# Patient Record
Sex: FEMALE | Race: WHITE | NOT HISPANIC OR LATINO
[De-identification: names, ages, dates, MRNs, and addresses within clinical notes are randomized per-mention and may not be internally consistent; named-entity substitution may affect disease eponyms.]

---

## 2020-11-05 PROBLEM — Z00.00 ENCOUNTER FOR PREVENTIVE HEALTH EXAMINATION: Status: ACTIVE | Noted: 2020-11-05

## 2020-11-10 ENCOUNTER — APPOINTMENT (OUTPATIENT)
Dept: ORTHOPEDIC SURGERY | Facility: CLINIC | Age: 65
End: 2020-11-10
Payer: MEDICARE

## 2020-11-10 ENCOUNTER — TRANSCRIPTION ENCOUNTER (OUTPATIENT)
Age: 65
End: 2020-11-10

## 2020-11-10 VITALS
BODY MASS INDEX: 26.68 KG/M2 | DIASTOLIC BLOOD PRESSURE: 85 MMHG | HEART RATE: 105 BPM | WEIGHT: 145 LBS | HEIGHT: 62 IN | SYSTOLIC BLOOD PRESSURE: 149 MMHG

## 2020-11-10 VITALS — TEMPERATURE: 97.6 F

## 2020-11-10 DIAGNOSIS — Z78.9 OTHER SPECIFIED HEALTH STATUS: ICD-10-CM

## 2020-11-10 PROCEDURE — 20550 NJX 1 TENDON SHEATH/LIGAMENT: CPT | Mod: F7

## 2020-11-10 PROCEDURE — 99203 OFFICE O/P NEW LOW 30 MIN: CPT | Mod: 25

## 2020-11-10 RX ORDER — FENOFIBRATE 134 MG/1
134 CAPSULE ORAL
Qty: 90 | Refills: 0 | Status: ACTIVE | COMMUNITY
Start: 2019-11-13

## 2020-11-10 RX ORDER — SIMVASTATIN 20 MG/1
20 TABLET, FILM COATED ORAL
Qty: 90 | Refills: 0 | Status: ACTIVE | COMMUNITY
Start: 2019-11-13

## 2023-03-22 ENCOUNTER — NON-APPOINTMENT (OUTPATIENT)
Age: 68
End: 2023-03-22

## 2023-03-28 ENCOUNTER — APPOINTMENT (OUTPATIENT)
Dept: ORTHOPEDIC SURGERY | Facility: CLINIC | Age: 68
End: 2023-03-28
Payer: MEDICARE

## 2023-03-28 VITALS — WEIGHT: 145 LBS | BODY MASS INDEX: 26.68 KG/M2 | HEIGHT: 62 IN

## 2023-03-28 DIAGNOSIS — M65.342 TRIGGER FINGER, LEFT RING FINGER: ICD-10-CM

## 2023-03-28 DIAGNOSIS — M65.332 TRIGGER FINGER, LEFT MIDDLE FINGER: ICD-10-CM

## 2023-03-28 DIAGNOSIS — M65.331 TRIGGER FINGER, RIGHT MIDDLE FINGER: ICD-10-CM

## 2023-03-28 PROCEDURE — 20550 NJX 1 TENDON SHEATH/LIGAMENT: CPT | Mod: 59,F3

## 2023-03-28 PROCEDURE — 99214 OFFICE O/P EST MOD 30 MIN: CPT | Mod: 25

## 2023-03-29 PROBLEM — M65.331 TRIGGER MIDDLE FINGER OF RIGHT HAND: Status: ACTIVE | Noted: 2020-11-10

## 2023-03-29 NOTE — PHYSICAL EXAM
[de-identified] : Right hand:\par Long finger A1 pulley slight tender.\par When patient makes composite fist long finger locks then triggers into full extension.\par minimal discomfort\par There is no other A1 pulley tenderness or triggering in any other finger, right hand.\par Basal joint manipulation minimal crepitus without pain\par No pertinent MP, PIP, or DIP joint contributory findings, except some Heberden's nodes; none are clinically painful.\par \par Left hand\par Minimal  A1 pulley tenderness long and ring finger\par No notable A1 pulley tenderness thumb, index, little fingers.\par When patient makes tight fist long and ring fingers lock,  then trigger into extension\par Pain associated\par No other A1 pulley tenderness and no other triggering in any finger.\par No pertinent MP, PIP, or DIP joint contributory findings, except some Heberden's nodes; none are clinically painful.  \par Basal joint no crepitus, no pain with manipulation\par \par Neurologic: Median, ulnar, and radial motor and sensory are intact. \par Skin: No cyanosis, clubbing, or rashes.\par Vascular: Radial pulses intact.\par Lymphatic: No streaking or epitrochlear adenopathy.\par The patient is awake, alert, and oriented. Affect appropriate. Cooperative.\par

## 2023-03-29 NOTE — DISCUSSION/SUMMARY
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  The patient has painful locking of the left long and ring trigger fingers at night for the past few months.  Because symptoms can be quite severe patient presents for treatment.  Cortisone injection was effective in the right long finger which only locks occasionally and is not painful.  Consequently, patient requested and was treated with cortisone injection in 2 phases for left long and left ring trigger finger which was well-tolerated.\par Patient has no other active hand problem requiring treatment.\par Because patient is playing tennis regularly, she remains at risk for recurrence and this was discussed.\par Prognosis uncertain because of ongoing tenderness playing and skiing.\par Return as needed \par The following post-injection instructions were given to the patient: The patient should be cautious in activities for two weeks and then increase to full activities.  Thereafter, the patient should return if symptoms continue, or if they kayleen and recur subsequently. If symptoms do not recur, the patient need not return and can be seen on an as needed basis. The patient has expressed understanding and acceptance of analysis, treatment, and recommendations.  All questions answered.

## 2023-03-29 NOTE — HISTORY OF PRESENT ILLNESS
[FreeTextEntry1] : Patient is 68 yo RHD female seen on 1 occasion, 11/10/2020, with trigger finger right long finger that was treated with cortisone injection.\par Patient reported that she was an avid sports woman. \par Patient reports playing tennis several times per week.\par Patient plays tennis right-handed and rarely uses the left hand for a backhand.\par During the winter patient is an avid downhill skier.\par Patient denies any specific injuries playing tennis or skiing.\par The patient has not been seen since that visit.  \par \par TODAY: \par Patient presents for hand evaluation.\par Patient states that the right long finger is much improved.\par If the patient makes an extremely tight fist on the right long finger locks and triggers into extension, with minimal discomfort, but otherwise right long trigger finger is not problematic.\par Pain can range from 2-7/10 intensity.\par Patient not taking any medication for pain.\par Patient not aware of any other trigger fingers in the right hand.\par Patient has no other notable right hand complaints.\par \par Left hand: Patient has been having pain in the left hand.\par Both long and ring fingers lock in the morning in particular\par Patient finds ring finger more painful and problematic than the long finger.\par Symptoms began 4 months possibly 6 months ago.\par Symptoms have been progressive.\par Patient not aware of symptoms in the thumb index or little fingers.\par \par Patient not consciously aware of numbness or tingling.\par No other notable hand complaints.

## 2023-06-07 ENCOUNTER — APPOINTMENT (OUTPATIENT)
Dept: MAMMOGRAPHY | Facility: CLINIC | Age: 68
End: 2023-06-07
Payer: MEDICARE

## 2023-06-07 ENCOUNTER — APPOINTMENT (OUTPATIENT)
Dept: ULTRASOUND IMAGING | Facility: CLINIC | Age: 68
End: 2023-06-07

## 2023-06-07 ENCOUNTER — OUTPATIENT (OUTPATIENT)
Dept: OUTPATIENT SERVICES | Facility: HOSPITAL | Age: 68
LOS: 1 days | End: 2023-06-07
Payer: MEDICARE

## 2023-06-07 DIAGNOSIS — K08.409 PARTIAL LOSS OF TEETH, UNSPECIFIED CAUSE, UNSPECIFIED CLASS: Chronic | ICD-10-CM

## 2023-06-07 DIAGNOSIS — Z00.00 ENCOUNTER FOR GENERAL ADULT MEDICAL EXAMINATION WITHOUT ABNORMAL FINDINGS: ICD-10-CM

## 2023-06-07 PROCEDURE — G0279: CPT | Mod: 26

## 2023-06-07 PROCEDURE — 76641 ULTRASOUND BREAST COMPLETE: CPT | Mod: 26,50,GA

## 2023-06-07 PROCEDURE — 77066 DX MAMMO INCL CAD BI: CPT | Mod: 26

## 2023-06-07 PROCEDURE — 77066 DX MAMMO INCL CAD BI: CPT

## 2023-06-07 PROCEDURE — G0279: CPT

## 2023-06-07 PROCEDURE — 76641 ULTRASOUND BREAST COMPLETE: CPT

## 2023-06-14 ENCOUNTER — APPOINTMENT (OUTPATIENT)
Dept: ULTRASOUND IMAGING | Facility: IMAGING CENTER | Age: 68
End: 2023-06-14
Payer: MEDICARE

## 2023-06-14 ENCOUNTER — OUTPATIENT (OUTPATIENT)
Dept: OUTPATIENT SERVICES | Facility: HOSPITAL | Age: 68
LOS: 1 days | End: 2023-06-14
Payer: MEDICARE

## 2023-06-14 ENCOUNTER — APPOINTMENT (OUTPATIENT)
Dept: MAMMOGRAPHY | Facility: IMAGING CENTER | Age: 68
End: 2023-06-14
Payer: MEDICARE

## 2023-06-14 DIAGNOSIS — Z00.8 ENCOUNTER FOR OTHER GENERAL EXAMINATION: ICD-10-CM

## 2023-06-14 DIAGNOSIS — K08.409 PARTIAL LOSS OF TEETH, UNSPECIFIED CAUSE, UNSPECIFIED CLASS: Chronic | ICD-10-CM

## 2023-06-14 PROCEDURE — 88305 TISSUE EXAM BY PATHOLOGIST: CPT | Mod: 26

## 2023-06-14 PROCEDURE — 38505 NEEDLE BIOPSY LYMPH NODES: CPT | Mod: RT

## 2023-06-14 PROCEDURE — 77066 DX MAMMO INCL CAD BI: CPT | Mod: 26

## 2023-06-14 PROCEDURE — 76942 ECHO GUIDE FOR BIOPSY: CPT

## 2023-06-14 PROCEDURE — 19083 BX BREAST 1ST LESION US IMAG: CPT

## 2023-06-14 PROCEDURE — 19081 BX BREAST 1ST LESION STRTCTC: CPT | Mod: LT

## 2023-06-14 PROCEDURE — 19081 BX BREAST 1ST LESION STRTCTC: CPT

## 2023-06-14 PROCEDURE — 88360 TUMOR IMMUNOHISTOCHEM/MANUAL: CPT | Mod: 26

## 2023-06-14 PROCEDURE — 88360 TUMOR IMMUNOHISTOCHEM/MANUAL: CPT

## 2023-06-14 PROCEDURE — 77065 DX MAMMO INCL CAD UNI: CPT

## 2023-06-14 PROCEDURE — 88377 M/PHMTRC ALYS ISHQUANT/SEMIQ: CPT | Mod: 26

## 2023-06-14 PROCEDURE — A4648: CPT

## 2023-06-14 PROCEDURE — 77065 DX MAMMO INCL CAD UNI: CPT | Mod: 26,RT

## 2023-06-14 PROCEDURE — 88377 M/PHMTRC ALYS ISHQUANT/SEMIQ: CPT

## 2023-06-14 PROCEDURE — 77065 DX MAMMO INCL CAD UNI: CPT | Mod: 26,LT

## 2023-06-14 PROCEDURE — 19083 BX BREAST 1ST LESION US IMAG: CPT | Mod: RT

## 2023-06-14 PROCEDURE — 88305 TISSUE EXAM BY PATHOLOGIST: CPT

## 2023-06-14 PROCEDURE — 77066 DX MAMMO INCL CAD BI: CPT

## 2023-06-14 PROCEDURE — 38505 NEEDLE BIOPSY LYMPH NODES: CPT

## 2023-06-14 PROCEDURE — 10035 PLMT SFT TISS LOCLZJ DEV 1ST: CPT | Mod: XU

## 2023-06-19 ENCOUNTER — NON-APPOINTMENT (OUTPATIENT)
Age: 68
End: 2023-06-19

## 2023-06-22 ENCOUNTER — APPOINTMENT (OUTPATIENT)
Dept: MRI IMAGING | Facility: IMAGING CENTER | Age: 68
End: 2023-06-22
Payer: MEDICARE

## 2023-06-22 ENCOUNTER — OUTPATIENT (OUTPATIENT)
Dept: OUTPATIENT SERVICES | Facility: HOSPITAL | Age: 68
LOS: 1 days | End: 2023-06-22
Payer: MEDICARE

## 2023-06-22 DIAGNOSIS — Z00.8 ENCOUNTER FOR OTHER GENERAL EXAMINATION: ICD-10-CM

## 2023-06-22 DIAGNOSIS — K08.409 PARTIAL LOSS OF TEETH, UNSPECIFIED CAUSE, UNSPECIFIED CLASS: Chronic | ICD-10-CM

## 2023-06-22 PROCEDURE — A9585: CPT

## 2023-06-22 PROCEDURE — C8908: CPT | Mod: MH

## 2023-06-22 PROCEDURE — 77049 MRI BREAST C-+ W/CAD BI: CPT | Mod: 26,MH

## 2023-06-22 PROCEDURE — C8937: CPT

## 2023-06-30 ENCOUNTER — APPOINTMENT (OUTPATIENT)
Dept: ULTRASOUND IMAGING | Facility: CLINIC | Age: 68
End: 2023-06-30

## 2023-06-30 ENCOUNTER — APPOINTMENT (OUTPATIENT)
Dept: MAMMOGRAPHY | Facility: CLINIC | Age: 68
End: 2023-06-30